# Patient Record
Sex: FEMALE | Race: WHITE | NOT HISPANIC OR LATINO | Employment: FULL TIME | URBAN - METROPOLITAN AREA
[De-identification: names, ages, dates, MRNs, and addresses within clinical notes are randomized per-mention and may not be internally consistent; named-entity substitution may affect disease eponyms.]

---

## 2018-01-03 ENCOUNTER — OFFICE VISIT (OUTPATIENT)
Dept: OBSTETRICS AND GYNECOLOGY | Facility: CLINIC | Age: 63
End: 2018-01-03

## 2018-01-03 VITALS
HEIGHT: 67 IN | DIASTOLIC BLOOD PRESSURE: 78 MMHG | BODY MASS INDEX: 37.54 KG/M2 | SYSTOLIC BLOOD PRESSURE: 116 MMHG | WEIGHT: 239.2 LBS

## 2018-01-03 DIAGNOSIS — N95.2 VAGINAL ATROPHY: ICD-10-CM

## 2018-01-03 DIAGNOSIS — N90.4 LICHEN SCLEROSUS ET ATROPHICUS OF THE VULVA: ICD-10-CM

## 2018-01-03 DIAGNOSIS — Z78.0 MENOPAUSE: Primary | ICD-10-CM

## 2018-01-03 DIAGNOSIS — Z01.419 ENCOUNTER FOR GYNECOLOGICAL EXAMINATION WITHOUT ABNORMAL FINDING: ICD-10-CM

## 2018-01-03 PROCEDURE — 99396 PREV VISIT EST AGE 40-64: CPT | Performed by: OBSTETRICS & GYNECOLOGY

## 2018-01-03 RX ORDER — LEVOTHYROXINE SODIUM 0.1 MG/1
1 TABLET ORAL DAILY
Refills: 0 | COMMUNITY
Start: 2017-12-08

## 2018-01-03 NOTE — PROGRESS NOTES
Chief Complaint   Patient presents with   • Gynecologic Exam   • Med Refill       Radha Looney is a 62 y.o. year old  presenting to be seen for her annual exam.This patient is menopausal and does not use estrogen/progestin therapy.  She has a history of lichen sclerosis of the vulva and is currently treated with clobetasol, 0.05% cream daily.  She has had a marked reduction in vulvar symptoms.  She has had previous tubal sterilization, breast reduction surgery, and a lap band procedure.  She has mild osteopenia of the femoral neck.    SCREENING TESTS    Year 2012   Age                         PAP                         HPV high risk                         Mammogram   benign                      REY score                         Breast MRI                         Lipids                         Vitamin D                         Colonoscopy                         DEXA  Frax (hip/any)    osteopenia                     Ovarian Screen                             She exercises regularly: no.  She wears her seat belt: yes.  She has concerns about domestic violence: no.  She has noticed changes in height: no    GYN screening history:  · Last mammogram: was done on approximately  and the result was: Birads II (Benign findings).  · Last DEXA: was done on approximately 2015 and the results were: osteopenia of the left femoral neck.    No Additional Complaints Reported    The following portions of the patient's history were reviewed and updated as appropriate:vital signs and   She  does not have any pertinent problems on file.  She  has a past surgical history that includes Tubal ligation; Breast Reduction; Laparoscopic gastric banding; Total hip arthroplasty (Right); Mouth surgery; and Skin biopsy.  Her family history is not on file.  She  reports that she has never smoked. She has never used  "smokeless tobacco. She reports that she does not drink alcohol or use illicit drugs.  Current Outpatient Prescriptions   Medication Sig Dispense Refill   • betamethasone valerate (VALISONE) 0.1 % ointment Apply a thin coat to the vulva daily 45 g 3   • hydrochlorothiazide (HYDRODIURIL) 50 MG tablet Take 1 tablet by mouth Daily.  0   • levothyroxine (SYNTHROID, LEVOTHROID) 100 MCG tablet Take 1 tablet by mouth Daily.  0   • potassium chloride (K-DUR,KLOR-CON) 10 MEQ CR tablet Take 1 tablet by mouth Daily.  0   • traMADol (ULTRAM) 50 MG tablet Take 1 tablet by mouth Daily.  0     No current facility-administered medications for this visit.      She is allergic to percocet [oxycodone-acetaminophen]..    Review of Systems  A comprehensive review of systems was taken.  Constitutional: negative for fever, chills, activity change, appetite change, fatigue and unexpected weight change.  Respiratory: negative  Cardiovascular: negative  Gastrointestinal: negative  Genitourinary:negative  Musculoskeletal:negative  Behavioral/Psych: negative       /78  Ht 170.2 cm (67\")  Wt 109 kg (239 lb 3.2 oz)  LMP  (LMP Unknown)  BMI 37.46 kg/m2    Physical Exam    General:  alert; cooperative; well developed; well nourished  obese - Body mass index is 37.46 kg/(m^2).   Skin:  No suspicious lesions seen   Thyroid: normal to inspection and palpation   Lungs:  clear to auscultation bilaterally   Heart:  regular rate and rhythm, S1, S2 normal, no murmur, click, rub or gallop   Breasts:  Examined in supine position  Symmetric without masses or skin dimpling  Nipples normal without inversion, lesions or discharge  There are no palpable axillary nodes  Fibrocystic changes are present both breasts without a discrete mass  scars bilaterally   Abdomen: soft, non-tender; no masses  no umbilical or inginual hernias are present  no hepato-splenomegaly   Pelvis: Clinical staff was present for exam  External genitalia:  Minimal changes of " lichen sclerosus  Vaginal:  atrophic mucosal changes are present;  Cervix:  normal appearance.  Uterus:  normal size, shape and consistency. anteverted;  Adnexa:  non palpable bilaterally.  Rectal:  anus visually normal appearing. recto-vaginal exam unremarkable and confirms findings;     Lab Review   No data reviewed    Imaging  DEXA- mild osteopenia of the left femoral neck with the total hip and spine normal  No recent mammogram reports available from [facility]         ASSESSMENT  Problems Addressed this Visit        Musculoskeletal and Integument    Lichen sclerosus et atrophicus of the vulva    Relevant Medications    betamethasone valerate (VALISONE) 0.1 % ointment       Genitourinary    Vaginal atrophy    Menopause - Primary      Other Visit Diagnoses     Encounter for gynecological examination without abnormal finding              PLAN    Medications prescribed this encounter:    New Medications Ordered This Visit   Medications   • levothyroxine (SYNTHROID, LEVOTHROID) 100 MCG tablet     Sig: Take 1 tablet by mouth Daily.     Refill:  0   • betamethasone valerate (VALISONE) 0.1 % ointment     Sig: Apply a thin coat to the vulva daily     Dispense:  45 g     Refill:  3   · Monthly self breast assessment and annual breast imaging  · Calcium, 600 mg/ Vit. D, 400 IU daily; regular weight-bearing exercise  · Follow up: 12 month(s)  *Please note that portions of this documentation may have been completed with a voice recognition program.  Efforts were made to edit this dictation, but occasional words may have been mistranscribed.       This note was electronically signed.    SHARRON Velasquez MD  January 3, 2018  2:38 PM

## 2019-01-14 ENCOUNTER — TELEPHONE (OUTPATIENT)
Dept: OBSTETRICS AND GYNECOLOGY | Facility: CLINIC | Age: 64
End: 2019-01-14

## 2019-01-15 ENCOUNTER — OFFICE VISIT (OUTPATIENT)
Dept: OBSTETRICS AND GYNECOLOGY | Facility: CLINIC | Age: 64
End: 2019-01-15

## 2019-01-15 VITALS
SYSTOLIC BLOOD PRESSURE: 124 MMHG | HEIGHT: 67 IN | DIASTOLIC BLOOD PRESSURE: 82 MMHG | BODY MASS INDEX: 36.66 KG/M2 | WEIGHT: 233.6 LBS

## 2019-01-15 DIAGNOSIS — N95.0 PMB (POSTMENOPAUSAL BLEEDING): ICD-10-CM

## 2019-01-15 DIAGNOSIS — N90.4 LICHEN SCLEROSUS ET ATROPHICUS OF THE VULVA: ICD-10-CM

## 2019-01-15 DIAGNOSIS — Z78.0 MENOPAUSE: Primary | ICD-10-CM

## 2019-01-15 PROCEDURE — 99213 OFFICE O/P EST LOW 20 MIN: CPT | Performed by: OBSTETRICS & GYNECOLOGY

## 2019-01-15 RX ORDER — INFLUENZA A VIRUS A/MICHIGAN/45/2015 X-275 (H1N1) ANTIGEN (FORMALDEHYDE INACTIVATED), INFLUENZA A VIRUS A/SINGAPORE/INFIMH-16-0019/2016 IVR-186 (H3N2) ANTIGEN (FORMALDEHYDE INACTIVATED), INFLUENZA B VIRUS B/PHUKET/3073/2013 ANTIGEN (FORMALDEHYDE INACTIVATED), AND INFLUENZA B VIRUS B/MARYLAND/15/2016 BX-69A ANTIGEN (FORMALDEHYDE INACTIVATED) 15; 15; 15; 15 UG/.5ML; UG/.5ML; UG/.5ML; UG/.5ML
INJECTION, SUSPENSION INTRAMUSCULAR
Refills: 0 | COMMUNITY
Start: 2018-10-15

## 2019-01-15 RX ORDER — CLOBETASOL PROPIONATE 0.5 MG/G
1 CREAM TOPICAL DAILY
Qty: 45 G | Refills: 1 | Status: SHIPPED | OUTPATIENT
Start: 2019-01-15 | End: 2019-04-16 | Stop reason: ALTCHOICE

## 2019-01-15 RX ORDER — HEPATITIS A VACCINE 1440 [IU]/ML
INJECTION, SUSPENSION INTRAMUSCULAR
Refills: 0 | COMMUNITY
Start: 2018-10-15

## 2019-01-15 RX ORDER — DOXYCYCLINE HYCLATE 100 MG/1
100 CAPSULE ORAL DAILY
Refills: 0 | COMMUNITY
Start: 2018-12-28

## 2019-01-15 NOTE — PROGRESS NOTES
Chief Complaint   Patient presents with   • Vaginal Bleeding     post-menopausal bleeding       Radha Looney is a 63 y.o. year old  presenting to be seen because of a 9 day history of postmenopausal spotting.  This patient has previously had a lap band procedure, breast reduction, and tubal sterilization.  She has been followed in the office with lichen sclerosus of the vulva and treated with clobetasol cream and Valisone ointment at various times.  She currently has a flare of vulvar irritation and erythema.  She denies bowel or urinary symptoms.  She has had some suprapubic cramping.    SCREENING TESTS    Year 2012   Age                         PAP     Neg.                    HPV high risk                         Mammogram      benign benign                  REY score                         Breast MRI                         Lipids                         Vitamin D                         Colonoscopy                         DEXA  Frax (hip/any)    osteopenia                     Ovarian Screen                             No Additional Complaints Reported    The following portions of the patient's history were reviewed and updated as appropriate:vital signs and   She  has a past medical history of Hidradenitis suppurativa, History of positive PPD, Hypertension, Hypothyroidism, Lichen sclerosus et atrophicus of the vulva, Menopause, and Vaginal atrophy.  She does not have any pertinent problems on file.  She  has a past surgical history that includes Tubal ligation; Breast Reduction; Laparoscopic gastric banding; Total hip arthroplasty (Right); Mouth surgery; and Skin biopsy.  Her family history is not on file.  She  reports that  has never smoked. she has never used smokeless tobacco. She reports that she does not drink alcohol or use drugs.  Current Outpatient Medications   Medication Sig  "Dispense Refill   • clobetasol prop emollient base (CLOBETASOL PROPIONATE E) 0.05 % emollient cream Apply 1 application topically to the appropriate area as directed Daily for 90 days. 45 g 1   • doxycycline (VIBRAMYCIN) 100 MG capsule Take 100 mg by mouth Daily.  0   • FLUZONE QUADRIVALENT 0.5 ML suspension prefilled syringe injection inject 0.5 milliliter intramuscularly  0   • HAVRIX 1440 EL U/ML vaccine inject 1 milliliter intramuscularly  0   • hydrochlorothiazide (HYDRODIURIL) 50 MG tablet Take 1 tablet by mouth Daily.  0   • levothyroxine (SYNTHROID, LEVOTHROID) 100 MCG tablet Take 1 tablet by mouth Daily.  0   • potassium chloride (K-DUR,KLOR-CON) 10 MEQ CR tablet Take 1 tablet by mouth Daily.  0   • traMADol (ULTRAM) 50 MG tablet Take 1 tablet by mouth Daily.  0     No current facility-administered medications for this visit.      She is allergic to percocet [oxycodone-acetaminophen]..        Review of Systems  A comprehensive review of systems was done.  Constitutional: negative for fever, chills, activity change, appetite change, fatigue and unexpected weight change.  Respiratory: negative  Cardiovascular: negative  Gastrointestinal: negative  Genitourinary:positive for vulvar irritation, spotting  Musculoskeletal:negative  Behavioral/Psych: negative          /82   Ht 170.2 cm (67\")   Wt 106 kg (233 lb 9.6 oz)   LMP  (LMP Unknown)   BMI 36.59 kg/m²     Physical Exam    General:  alert; cooperative; well developed; well nourished   Skin:  No suspicious lesions seen   Thyroid: normal to inspection and palpation   Lungs:  clear to auscultation bilaterally   Heart:  regular rate and rhythm, S1, S2 normal, no murmur, click, rub or gallop   Breasts:  Not performed.   Abdomen: soft, non-tender; no masses  no umbilical or inguinal hernias are present  no hepato-splenomegaly   Pelvis: Clinical staff was present for exam  External genitalia:  Severe erythema and excoriation  Vaginal:  normal pink mucosa " without prolapse or lesions.  Cervix:  normal appearance.  Uterus:  normal size, shape and consistency. anteverted;  Adnexa:  non palpable bilaterally.     Lab Review   No data reviewed    Imaging   Mammogram results and DEXA    Medical counseling was given to patient for the following topics: diagnostic results, instructions for management, prognosis and impressions . Total time of the encounter was 23 minute(s) and 14 minute(s)  was spent in face to face counseling.  I have counseled the patient that her postmenopausal spotting needs to be evaluated.  I have counseled her that we need to schedule a pelvic ultrasound and if that is abnormal she will be scheduled for hysteroscopy D&C.  I have counseled the patient that her lichen sclerosis has flared up and she needs dermatologic evaluation.  I have changed her to clobetasol cream to be applied daily until she can see dermatology.     ASSESSMENT  Problems Addressed this Visit        Musculoskeletal and Integument    Lichen sclerosus et atrophicus of the vulva    Relevant Medications    clobetasol prop emollient base (CLOBETASOL PROPIONATE E) 0.05 % emollient cream       Genitourinary    Menopause - Primary      Other Visit Diagnoses     PMB (postmenopausal bleeding)        Relevant Orders    US Non-ob Transvaginal            PLAN    Medications prescribed this encounter:    New Medications Ordered This Visit   Medications   • clobetasol prop emollient base (CLOBETASOL PROPIONATE E) 0.05 % emollient cream     Sig: Apply 1 application topically to the appropriate area as directed Daily for 90 days.     Dispense:  45 g     Refill:  1   · Pelvic ultrasound scan-reveals an endometrial stripe of 10.6 mm with a normal size anteverted uterus.  Her ovaries are normal.  · I have counseled the patient that due to the endometrial thickening she will need to have a hysteroscopy D&C for further evaluation.  I have counseled her about the differential diagnosis of a polyp versus  endometrial hyperplasia with or without atypia.  I have counseled her about the surgical risks of uterine perforation, bleeding, infection, and anesthetic risks.  She voiced understanding of these risks.  Pamphlets were given to the patient.  · Follow up: 2 week(s) for surgery  · Calcium, 600 mg/ Vit. D, 400 IU daily     *Please note that portions of this documentation may have been completed with a voice recognition program.  Efforts were made to edit this dictation, but occasional words may have been mistranscribed.     This note was electronically signed.    SHARRON Velasquez MD  January 15, 2019  10:46 AM

## 2019-01-28 ENCOUNTER — DOCUMENTATION (OUTPATIENT)
Dept: OBSTETRICS AND GYNECOLOGY | Facility: CLINIC | Age: 64
End: 2019-01-28

## 2019-01-28 NOTE — PROGRESS NOTES
History and Physical    Chief Complaint: postmenopausal bleeding    Radha Looney is a 63 y.o., , who presented to the office on 1/15/2019 with a 9 day history of postmenopausal spotting.  This patient has previously had a lap band procedure, breast reduction, and tubal sterilization.  She is treated for lichen sclerosis of the vulva.  She had a pelvic ultrasound to evaluate her postmenopausal spotting in the endometrial stripe measured 10.6 mm.  Her uterus was normal size and anteverted.  Her adnexa were not visualized.  Scheduled for a hysteroscopy/D&C with the Myosure reach device to further evaluate her bleeding.  The surgical risks of uterine perforation, bleeding, infection, and anesthetic risks were explained to the patient.  She voiced understanding of these risks.  Informed consent was signed.    Past Medical History:   Diagnosis Date   • Hidradenitis suppurativa    • History of positive PPD    • Hypertension    • Hypothyroidism    • Lichen sclerosus et atrophicus of the vulva    • Menopause    • Vaginal atrophy        Allergies: Percocet [oxycodone-acetaminophen]    Medications:   Current Outpatient Medications:   •  clobetasol prop emollient base (CLOBETASOL PROPIONATE E) 0.05 % emollient cream, Apply 1 application topically to the appropriate area as directed Daily for 90 days., Disp: 45 g, Rfl: 1  •  doxycycline (VIBRAMYCIN) 100 MG capsule, Take 100 mg by mouth Daily., Disp: , Rfl: 0  •  FLUZONE QUADRIVALENT 0.5 ML suspension prefilled syringe injection, inject 0.5 milliliter intramuscularly, Disp: , Rfl: 0  •  HAVRIX 1440 EL U/ML vaccine, inject 1 milliliter intramuscularly, Disp: , Rfl: 0  •  hydrochlorothiazide (HYDRODIURIL) 50 MG tablet, Take 1 tablet by mouth Daily., Disp: , Rfl: 0  •  levothyroxine (SYNTHROID, LEVOTHROID) 100 MCG tablet, Take 1 tablet by mouth Daily., Disp: , Rfl: 0  •  potassium chloride (K-DUR,KLOR-CON) 10 MEQ CR tablet, Take 1 tablet by mouth Daily., Disp: , Rfl:  0  •  traMADol (ULTRAM) 50 MG tablet, Take 1 tablet by mouth Daily., Disp: , Rfl: 0    Previous Surgery:   Past Surgical History:   Procedure Laterality Date   • BILATERAL BREAST REDUCTION     • LAPAROSCOPIC GASTRIC BANDING     • MOUTH SURGERY     • SKIN BIOPSY      right thigh   • TOTAL HIP ARTHROPLASTY Right    • TUBAL ABDOMINAL LIGATION         Review of Systems  A comprehensive review of systems was negative.  Constitutional: negative for fever, chills, activity change, appetite change, fatigue and unexpected weight change.  Respiratory: negative  Cardiovascular: negative  Gastrointestinal: negative  Genitourinary:spotting and vulvar irritation  Musculoskeletal:negative  Behavioral/Psych: negative      Social History     Tobacco Use   • Smoking status: Never Smoker   • Smokeless tobacco: Never Used   Substance Use Topics   • Alcohol use: No       Recent Vitals       11/9/2016 1/3/2018 1/15/2019       BP:  142/80  116/78  124/82     Weight:  105 kg (230 lb 12.8 oz)  109 kg (239 lb 3.2 oz)  106 kg (233 lb 9.6 oz)     BMI (Calculated):  36.1  37.5  36.6           Physical Exam  General:  alert; cooperative; well developed; well nourished  obese - There is no height or weight on file to calculate BMI.   Skin:  No suspicious lesions seen   Thyroid: normal to inspection and palpation   Lungs:  clear to auscultation bilaterally   Heart:  regular rate and rhythm, S1, S2 normal, no murmur, click, rub or gallop   Breasts:  Examined in supine position  Symmetric without masses or skin dimpling  Nipples normal without inversion, lesions or discharge  There are no palpable axillary nodes   Abdomen: soft, non-tender; no masses  no umbilical or inguinal hernias are present  no hepato-splenomegaly   Pelvis: Clinical staff was present for exam  External genitalia:  Lichen sclerosus  Vaginal:  normal pink mucosa without prolapse or lesions.  Cervix:  normal appearance.  Uterus:  normal size, shape and consistency.  anteverted;  Adnexa:  non palpable bilaterally.  Rectal:  anus visually normal appearing. recto-vaginal exam unremarkable and confirms findings;         The surgical risks of uterine perforation, bleeding, infection and anesthestic risks were explained to the patient and she voiced understanding. Informed consent was signed.    No contraindications to planned surgery were detected      Impression: 1) Postmenopausal bleeding [N95.0], Thickened endometrium [R93.89]        Plan: 1) Hysteroscopy/D&C Myosure Reaxh      *Please note that portions of this documentation may have been completed with a voice recognition program.  Efforts were made to edit this dictation, but occasional words may have been mistranscribed.  This note was electronically signed.    SHARRON Velasquez MD  January 28, 2019  12:46 PM

## 2019-01-29 ENCOUNTER — TELEPHONE (OUTPATIENT)
Dept: OBSTETRICS AND GYNECOLOGY | Facility: CLINIC | Age: 64
End: 2019-01-29

## 2019-01-29 NOTE — TELEPHONE ENCOUNTER
"Called Radha to discuss ABN lab results faxed to us this AM. She states she had already been notified of low potassium level and PCP had sent in an RX for her to take for the next 3 days. Asked if she had recently had a cold because of elevated WBCs, she said she actually had her PCP check her throat yeaterday because she has noticed feeling a \"scratchy throat\" since the weekend. She states they said it was red, and offered antibiotics but she declined. Per Dr Velasquez, have PCP get her on antibiotics, and also to have PCP treat low Vit D level. Pt said she would call them back this afternoon.  "

## 2019-02-01 ENCOUNTER — LAB REQUISITION (OUTPATIENT)
Dept: LAB | Facility: HOSPITAL | Age: 64
End: 2019-02-01

## 2019-02-01 ENCOUNTER — OUTSIDE FACILITY SERVICE (OUTPATIENT)
Dept: OBSTETRICS AND GYNECOLOGY | Facility: CLINIC | Age: 64
End: 2019-02-01

## 2019-02-01 DIAGNOSIS — N95.0 POSTMENOPAUSAL BLEEDING: ICD-10-CM

## 2019-02-01 PROCEDURE — 88305 TISSUE EXAM BY PATHOLOGIST: CPT | Performed by: OBSTETRICS & GYNECOLOGY

## 2019-02-01 PROCEDURE — 58558 HYSTEROSCOPY BIOPSY: CPT | Performed by: OBSTETRICS & GYNECOLOGY

## 2019-02-04 LAB
CYTO UR: NORMAL
LAB AP CASE REPORT: NORMAL
LAB AP CLINICAL INFORMATION: NORMAL
PATH REPORT.FINAL DX SPEC: NORMAL
PATH REPORT.GROSS SPEC: NORMAL

## 2019-04-16 ENCOUNTER — OFFICE VISIT (OUTPATIENT)
Dept: OBSTETRICS AND GYNECOLOGY | Facility: CLINIC | Age: 64
End: 2019-04-16

## 2019-04-16 VITALS
WEIGHT: 232.6 LBS | DIASTOLIC BLOOD PRESSURE: 70 MMHG | SYSTOLIC BLOOD PRESSURE: 112 MMHG | HEIGHT: 67 IN | BODY MASS INDEX: 36.51 KG/M2

## 2019-04-16 DIAGNOSIS — L73.2 HIDRADENITIS SUPPURATIVA: ICD-10-CM

## 2019-04-16 DIAGNOSIS — N90.4 LICHEN SCLEROSUS ET ATROPHICUS OF THE VULVA: Primary | ICD-10-CM

## 2019-04-16 DIAGNOSIS — Z78.0 MENOPAUSE: ICD-10-CM

## 2019-04-16 DIAGNOSIS — N95.2 VAGINAL ATROPHY: ICD-10-CM

## 2019-04-16 DIAGNOSIS — Z01.419 ENCOUNTER FOR GYNECOLOGICAL EXAMINATION WITHOUT ABNORMAL FINDING: ICD-10-CM

## 2019-04-16 PROCEDURE — 99396 PREV VISIT EST AGE 40-64: CPT | Performed by: OBSTETRICS & GYNECOLOGY

## 2019-04-16 RX ORDER — POTASSIUM CHLORIDE 750 MG/1
1 TABLET, FILM COATED, EXTENDED RELEASE ORAL DAILY
Refills: 0 | COMMUNITY
Start: 2019-04-10

## 2019-04-16 RX ORDER — ERGOCALCIFEROL 1.25 MG/1
50000 CAPSULE ORAL WEEKLY
Refills: 0 | COMMUNITY
Start: 2019-01-29

## 2019-04-16 NOTE — PROGRESS NOTES
Chief Complaint   Patient presents with   • Gynecologic Exam   • Med Refill       Radha Looney is a 63 y.o. year old  presenting to be seen for her annual exam.  This patient has a past history of bilateral breast reduction surgery; tubal sterilization; a lap band procedure; and right hip replacement.  She has a long history of hidradenitis suppurativa and lichen sclerosis of the vulva.  She has had biopsy confirmation.  She currently uses clobetasol, 0.05% cream to the vulva daily, but has recently noted a flareup.  She recently underwent a hysteroscopy/D&C/endometrial polypectomy for postmenopausal bleeding on 2019 with benign curettings.  She denies bowel or urinary symptoms.    SCREENING TESTS    Year 2012   Age                         PAP     Neg.                    HPV high risk                         Mammogram       benign                  REY score                         Breast MRI                         Lipids                         Vitamin D                         Colonoscopy                         DEXA  Frax (hip/any)                         Ovarian Screen                             She exercises regularly: yes.  She wears her seat belt: yes.  She has concerns about domestic violence: no.  She has noticed changes in height: no    GYN screening history:  · Last pap: was done on approximately 2016 and the result was: normal PAP.  · Last mammogram: was done on approximately 1/3/2018 and the result was: Birads II (Benign findings)..    No Additional Complaints Reported    The following portions of the patient's history were reviewed and updated as appropriate:vital signs and   She  has a past medical history of Hidradenitis suppurativa, History of positive PPD, Hypertension, Hypothyroidism, Lichen sclerosus et atrophicus of the vulva, Menopause, and Vaginal atrophy.  She  "does not have any pertinent problems on file.  She  has a past surgical history that includes Tubal ligation; Breast Reduction; Laparoscopic gastric banding; Total hip arthroplasty (Right); Mouth surgery; and Skin biopsy.  Her family history is not on file.  She  reports that she has never smoked. She has never used smokeless tobacco. She reports that she does not drink alcohol or use drugs.  Current Outpatient Medications   Medication Sig Dispense Refill   • doxycycline (VIBRAMYCIN) 100 MG capsule Take 100 mg by mouth Daily.  0   • FLUZONE QUADRIVALENT 0.5 ML suspension prefilled syringe injection inject 0.5 milliliter intramuscularly  0   • HAVRIX 1440 EL U/ML vaccine inject 1 milliliter intramuscularly  0   • hydrochlorothiazide (HYDRODIURIL) 50 MG tablet Take 1 tablet by mouth Daily.  0   • hydrocortisone 2.5 % cream Apply to the vulva, perineum and groin daily 45 g 2   • ibuprofen (ADVIL,MOTRIN) 600 MG tablet Take 1 tablet by mouth Every 6 (Six) Hours As Needed for pain. 30 tablet 0   • levothyroxine (SYNTHROID, LEVOTHROID) 100 MCG tablet Take 1 tablet by mouth Daily.  0   • potassium chloride (K-DUR) 10 MEQ CR tablet Take 1 tablet by mouth Daily.  0   • traMADol (ULTRAM) 50 MG tablet Take 1 tablet by mouth Daily.  0   • vitamin D (ERGOCALCIFEROL) 39726 units capsule capsule Take 50,000 Units by mouth 1 (One) Time Per Week.  0     No current facility-administered medications for this visit.      She is allergic to percocet [oxycodone-acetaminophen]..    Review of Systems  A comprehensive review of systems was taken.  Constitutional: negative for fever, chills, activity change, appetite change, fatigue and unexpected weight change.  Respiratory: negative  Cardiovascular: negative  Gastrointestinal: negative  Genitourinary:positive for vulvar and groin inflammation and discomfort  Musculoskeletal:negative  Behavioral/Psych: negative       /70   Ht 170.2 cm (67\")   Wt 106 kg (232 lb 9.6 oz)   LMP  (LMP " Unknown)   BMI 36.43 kg/m²     Physical Exam    General:  alert; cooperative; well developed; well nourished   Skin:  No suspicious lesions seen   Thyroid: normal to inspection and palpation   Lungs:  clear to auscultation bilaterally   Heart:  regular rate and rhythm, S1, S2 normal, no murmur, click, rub or gallop   Breasts:  Examined in supine position  Symmetric without masses or skin dimpling  Nipples normal without inversion, lesions or discharge  There are no palpable axillary nodes  scars bilaterally   Abdomen: soft, non-tender; no masses  no umbilical or inguinal hernias are present  no hepato-splenomegaly   Pelvis: Clinical staff was present for exam  External genitalia:  severe erythema and scaling of the labia an groin  Vaginal:  normal pink mucosa without prolapse or lesions.  Cervix:  normal appearance.  Uterus:  normal size, shape and consistency. anteverted;  Adnexa:  non palpable bilaterally.  Rectal:  anus visually normal appearing. recto-vaginal exam unremarkable and confirms findings;     Lab Review   No data reviewed    Imaging  Mammogram results         ASSESSMENT  Problems Addressed this Visit        Musculoskeletal and Integument    Lichen sclerosus et atrophicus of the vulva - Primary    Relevant Medications    hydrocortisone 2.5 % cream    Hidradenitis suppurativa    Relevant Medications    hydrocortisone 2.5 % cream       Genitourinary    Vaginal atrophy    Menopause      Other Visit Diagnoses     Encounter for gynecological examination without abnormal finding        Relevant Orders    Liquid-based Pap Smear, Screening          PLAN    Medications prescribed this encounter:    New Medications Ordered This Visit   Medications   • hydrocortisone 2.5 % cream     Sig: Apply to the vulva, perineum and groin daily     Dispense:  45 g     Refill:  2   · Dermatology referral  · Monthly self breast assessment and annual breast imaging  · Pap test done  · Calcium, 600 mg/ Vit. D, 400 IU daily;  regular weight-bearing exercise  · Follow up: 12 month(s)  *Please note that portions of this documentation may have been completed with a voice recognition program.  Efforts were made to edit this dictation, but occasional words may have been mistranscribed.       This note was electronically signed.    SHARRON Velasquez MD  April 16, 2019  11:03 AM

## 2023-11-28 ENCOUNTER — OUTSIDE FACILITY SERVICE (OUTPATIENT)
Dept: GASTROENTEROLOGY | Facility: CLINIC | Age: 68
End: 2023-11-28
Payer: MEDICARE

## 2023-11-28 PROCEDURE — G0105 COLORECTAL SCRN; HI RISK IND: HCPCS | Performed by: INTERNAL MEDICINE
